# Patient Record
Sex: FEMALE | Race: WHITE | NOT HISPANIC OR LATINO | Employment: OTHER | URBAN - METROPOLITAN AREA
[De-identification: names, ages, dates, MRNs, and addresses within clinical notes are randomized per-mention and may not be internally consistent; named-entity substitution may affect disease eponyms.]

---

## 2023-10-09 ENCOUNTER — TELEPHONE (OUTPATIENT)
Dept: CARDIOLOGY | Facility: CLINIC | Age: 73
End: 2023-10-09
Payer: COMMERCIAL

## 2023-10-09 NOTE — TELEPHONE ENCOUNTER
I have not data on the new vaccines and their correlation of cardiac side effects   Previous covid vaccine caused myocarditis in young men. No other populations were noted.

## 2023-11-29 ENCOUNTER — OFFICE VISIT (OUTPATIENT)
Dept: CARDIOLOGY | Facility: CLINIC | Age: 73
End: 2023-11-29
Payer: COMMERCIAL

## 2023-11-29 ENCOUNTER — HOSPITAL ENCOUNTER (OUTPATIENT)
Dept: CARDIOLOGY | Facility: CLINIC | Age: 73
Discharge: HOME | End: 2023-11-29
Payer: COMMERCIAL

## 2023-11-29 VITALS
SYSTOLIC BLOOD PRESSURE: 138 MMHG | HEIGHT: 67 IN | DIASTOLIC BLOOD PRESSURE: 68 MMHG | WEIGHT: 181.1 LBS | HEART RATE: 75 BPM | BODY MASS INDEX: 28.43 KG/M2

## 2023-11-29 DIAGNOSIS — I10 HYPERTENSION, UNSPECIFIED TYPE: ICD-10-CM

## 2023-11-29 DIAGNOSIS — I42.1 HOCM (HYPERTROPHIC OBSTRUCTIVE CARDIOMYOPATHY) (MULTI): ICD-10-CM

## 2023-11-29 DIAGNOSIS — I42.1 HOCM (HYPERTROPHIC OBSTRUCTIVE CARDIOMYOPATHY) (MULTI): Primary | ICD-10-CM

## 2023-11-29 DIAGNOSIS — R00.2 PALPITATIONS: ICD-10-CM

## 2023-11-29 LAB
AORTIC VALVE MEAN GRADIENT: 11
AORTIC VALVE PEAK VELOCITY: 2.25
AV PEAK GRADIENT: 20.2
AVA (PEAK VEL): 2
AVA (VTI): 1.99
EJECTION FRACTION APICAL 4 CHAMBER: 72.3
EJECTION FRACTION: 71
GLOBAL LONGITUDINAL STRAIN: -24
LEFT ATRIUM VOLUME AREA LENGTH INDEX BSA: 32.9
LEFT VENTRICLE INTERNAL DIMENSION DIASTOLE: 3.3 (ref 3.5–6)
LEFT VENTRICULAR OUTFLOW TRACT DIAMETER: 1.85
MITRAL VALVE E/A RATIO: 0.72
MITRAL VALVE E/E' RATIO: 19.4
RIGHT VENTRICLE FREE WALL PEAK S': 14
RIGHT VENTRICLE PEAK SYSTOLIC PRESSURE: 29.9
TRICUSPID ANNULAR PLANE SYSTOLIC EXCURSION: 1.8

## 2023-11-29 PROCEDURE — 93306 TTE W/DOPPLER COMPLETE: CPT | Performed by: INTERNAL MEDICINE

## 2023-11-29 PROCEDURE — 1036F TOBACCO NON-USER: CPT | Performed by: NURSE PRACTITIONER

## 2023-11-29 PROCEDURE — 3075F SYST BP GE 130 - 139MM HG: CPT | Performed by: NURSE PRACTITIONER

## 2023-11-29 PROCEDURE — 3078F DIAST BP <80 MM HG: CPT | Performed by: NURSE PRACTITIONER

## 2023-11-29 PROCEDURE — 99214 OFFICE O/P EST MOD 30 MIN: CPT | Mod: 25 | Performed by: NURSE PRACTITIONER

## 2023-11-29 PROCEDURE — 93306 TTE W/DOPPLER COMPLETE: CPT

## 2023-11-29 PROCEDURE — 1160F RVW MEDS BY RX/DR IN RCRD: CPT | Performed by: NURSE PRACTITIONER

## 2023-11-29 PROCEDURE — 99214 OFFICE O/P EST MOD 30 MIN: CPT | Performed by: NURSE PRACTITIONER

## 2023-11-29 PROCEDURE — 1159F MED LIST DOCD IN RCRD: CPT | Performed by: NURSE PRACTITIONER

## 2023-11-29 RX ORDER — CHOLECALCIFEROL (VITAMIN D3) 50 MCG
1 TABLET ORAL DAILY
COMMUNITY

## 2023-11-29 RX ORDER — ATORVASTATIN CALCIUM 20 MG/1
20 TABLET, FILM COATED ORAL
COMMUNITY

## 2023-11-29 RX ORDER — DILTIAZEM HYDROCHLORIDE 120 MG/1
1 CAPSULE, EXTENDED RELEASE ORAL EVERY 24 HOURS
COMMUNITY
End: 2023-12-11 | Stop reason: SDUPTHER

## 2023-11-29 RX ORDER — CALCIUM CARBONATE 300MG(750)
TABLET,CHEWABLE ORAL
COMMUNITY

## 2023-11-29 RX ORDER — CHOLECALCIFEROL (VITAMIN D3) 125 MCG
CAPSULE ORAL
COMMUNITY

## 2023-11-29 RX ORDER — FLUTICASONE PROPIONATE 50 MCG
SPRAY, SUSPENSION (ML) NASAL
COMMUNITY

## 2023-11-29 RX ORDER — ASCORBIC ACID 300 MG
TABLET,CHEWABLE ORAL
COMMUNITY

## 2023-11-29 NOTE — PROGRESS NOTES
Chief Complaint:   HOCM     History Of Present Illness:    Trisha Thomas is a 73 y.o. female here with HOCM    No cardiac complaints. She denies SOB, syncope, dizziness, lightheadedness.     Echocardiogram 1/9/2023  Severe upper septal hypertrophy      Allergies:  Patient has no allergy information on record.    Review of Systems  All pertinent systems have been reviewed and are negative except for what is stated in the history of present illness.    All other systems have been reviewed and are negative and noncontributory to this patient's current ailments.     Objective   Vitals reviewed.   Constitutional:       Appearance: Healthy appearance. Not in distress.   Neck:      Vascular: No JVR. JVD normal.   Pulmonary:      Effort: Pulmonary effort is normal.      Breath sounds: Normal breath sounds. No wheezing. No rhonchi. No rales.   Chest:      Chest wall: Not tender to palpatation.   Cardiovascular:      PMI at left midclavicular line. Normal rate. Regular rhythm. Normal S1. Normal S2.       Murmurs: There is a grade 2/6 systolic murmur.      No gallop.  No click. No rub.   Edema:     Peripheral edema absent.   Abdominal:      General: Bowel sounds are normal.      Palpations: Abdomen is soft.      Tenderness: There is no abdominal tenderness.   Musculoskeletal: Normal range of motion.         General: No tenderness. Skin:     General: Skin is warm and dry.   Neurological:      General: No focal deficit present.      Mental Status: Alert and oriented to person, place and time.       Assessment/Plan   Diagnoses and all orders for this visit:  HOCM (hypertrophic obstructive cardiomyopathy) (CMS/HCC)  - s/p echo today  - annual echo  - remains asymptomatic   Palpitations  - resolved  Hypertension, unspecified type  - stable, well controlled      Current Outpatient Medications:     atorvastatin (Lipitor) 20 mg tablet, Take 1 tablet (20 mg) by mouth once daily., Disp: , Rfl:     biotin 5,000 mcg tablet,disintegrating,  Take by mouth., Disp: , Rfl:     cholecalciferol (Vitamin D-3) 50 MCG (2000 UT) tablet, Take 1 tablet (2,000 Units) by mouth once daily., Disp: , Rfl:     dilTIAZem SR (Cardizem SR) 120 mg 12 hr capsule, 1 capsule (120 mg) once every 24 hours., Disp: , Rfl:     fluticasone (Flonase Allergy Relief) 50 mcg/actuation nasal spray, 2 spray(s) each nostril intranasally once a day prn, Disp: , Rfl:     glucos sul 2KCl-msm-chond-C-Mn (Glucosamine Chondroitin) 550-30-1 mg capsule, Take by mouth., Disp: , Rfl:     magnesium oxide (Mag-Ox) 400 mg tablet, Take by mouth., Disp: , Rfl:

## 2023-12-04 ENCOUNTER — TELEPHONE (OUTPATIENT)
Dept: CARDIOLOGY | Facility: CLINIC | Age: 73
End: 2023-12-04
Payer: COMMERCIAL

## 2023-12-04 NOTE — TELEPHONE ENCOUNTER
Pt would like to know what this means prognostically. Pt would like to know if septal reduction procedure needs done and when to return for a follow-up? She would like to directly speak with you as she has questions and would like to have a virtual visit this week. Please advise. Thank you

## 2023-12-04 NOTE — TELEPHONE ENCOUNTER
Pt called and would like results of echo and also when should she schedule a follow up ? Please advise. Thank you

## 2023-12-11 ENCOUNTER — TELEMEDICINE (OUTPATIENT)
Dept: CARDIOLOGY | Facility: CLINIC | Age: 73
End: 2023-12-11
Payer: COMMERCIAL

## 2023-12-11 DIAGNOSIS — I42.1 HOCM (HYPERTROPHIC OBSTRUCTIVE CARDIOMYOPATHY) (MULTI): Primary | ICD-10-CM

## 2023-12-11 PROCEDURE — 99213 OFFICE O/P EST LOW 20 MIN: CPT | Performed by: NURSE PRACTITIONER

## 2023-12-11 RX ORDER — DILTIAZEM HYDROCHLORIDE 120 MG/1
120 CAPSULE, EXTENDED RELEASE ORAL EVERY 24 HOURS
Qty: 90 CAPSULE | Refills: 3 | Status: SHIPPED | OUTPATIENT
Start: 2023-12-11 | End: 2023-12-13 | Stop reason: ENTERED-IN-ERROR

## 2023-12-11 NOTE — PROGRESS NOTES
Chief Complaint:   HOCM     History Of Present Illness:    Trisha Thomas is a 73 y.o. female here with HOCM.    Phone call to discuss echo results.     No cardiac complaints. She denies SOB, syncope, dizziness, lightheadedness.     Echocardiogram 1/9/2023  Severe upper septal hypertrophy      Allergies:  Escitalopram oxalate    Review of Systems  All pertinent systems have been reviewed and are negative except for what is stated in the history of present illness.    All other systems have been reviewed and are negative and noncontributory to this patient's current ailments.       Assessment/Plan   Diagnoses and all orders for this visit:  HOCM (hypertrophic obstructive cardiomyopathy) (CMS/[facility])  - we reviewed echo results which showed moderate LV obstruction  - she remains asymptomatic. We discussed if she would become symptomatic we could pursue surgery vs ETOH ablation  - symptoms to monitor for were HARRIS, syncope, palpitations, chest pain  - already on appropriate medication with Cardizem  - if additional agent needed we will start metoprolol succinate. Avoiding carvedilol and labetalol due to vasodilatory affects   - avoid amlodipine  - avoid dehydration and agents that cause hypovolemia   - annual echo or sooner if symptoms  - remains asymptomatic       Follow up in 6 months     Outpatient Medications:  Current Outpatient Medications   Medication Instructions    atorvastatin (LIPITOR) 20 mg, oral, Daily RT    biotin 5,000 mcg tablet,disintegrating oral    cholecalciferol (Vitamin D-3) 50 MCG (2000 UT) tablet 1 tablet, oral, Daily    dilTIAZem SR (Cardizem SR) 120 mg 12 hr capsule 1 capsule, Every 24 hours    fluticasone (Flonase Allergy Relief) 50 mcg/actuation nasal spray 2 spray(s) each nostril intranasally once a day prn    glucos sul 2KCl-msm-chond-C-Mn (Glucosamine Chondroitin) 550-30-1 mg capsule oral    magnesium oxide (Mag-Ox) 400 mg tablet oral

## 2023-12-12 ENCOUNTER — TELEPHONE (OUTPATIENT)
Dept: CARDIOLOGY | Facility: CLINIC | Age: 73
End: 2023-12-12
Payer: COMMERCIAL

## 2023-12-12 DIAGNOSIS — I42.1 HOCM (HYPERTROPHIC OBSTRUCTIVE CARDIOMYOPATHY) (MULTI): ICD-10-CM

## 2023-12-12 NOTE — TELEPHONE ENCOUNTER
Received a note from the pharmacy that the  patient has been taking Cardizem CD.  They received the refill today for Cardizem SR and wants to know if this is correct. Is the patient changing to Cardizem SR?  Please advise

## 2023-12-13 RX ORDER — DILTIAZEM HYDROCHLORIDE 120 MG/1
120 CAPSULE, COATED, EXTENDED RELEASE ORAL DAILY
COMMUNITY
End: 2023-12-13 | Stop reason: SDUPTHER

## 2023-12-13 RX ORDER — DILTIAZEM HYDROCHLORIDE 120 MG/1
120 CAPSULE, COATED, EXTENDED RELEASE ORAL DAILY
Qty: 90 CAPSULE | Refills: 3 | Status: SHIPPED | OUTPATIENT
Start: 2023-12-13 | End: 2024-02-20 | Stop reason: SDUPTHER

## 2024-02-20 DIAGNOSIS — I42.1 HOCM (HYPERTROPHIC OBSTRUCTIVE CARDIOMYOPATHY) (MULTI): ICD-10-CM

## 2024-02-20 RX ORDER — DILTIAZEM HYDROCHLORIDE 120 MG/1
120 CAPSULE, COATED, EXTENDED RELEASE ORAL DAILY
Qty: 90 CAPSULE | Refills: 3 | Status: SHIPPED | OUTPATIENT
Start: 2024-02-20

## 2024-06-11 ENCOUNTER — OFFICE VISIT (OUTPATIENT)
Dept: CARDIOLOGY | Facility: CLINIC | Age: 74
End: 2024-06-11
Payer: COMMERCIAL

## 2024-06-11 VITALS
HEART RATE: 72 BPM | SYSTOLIC BLOOD PRESSURE: 147 MMHG | BODY MASS INDEX: 28.35 KG/M2 | DIASTOLIC BLOOD PRESSURE: 72 MMHG | WEIGHT: 181 LBS

## 2024-06-11 DIAGNOSIS — R00.2 PALPITATIONS: ICD-10-CM

## 2024-06-11 DIAGNOSIS — I10 HYPERTENSION, UNSPECIFIED TYPE: ICD-10-CM

## 2024-06-11 DIAGNOSIS — I42.1 HOCM (HYPERTROPHIC OBSTRUCTIVE CARDIOMYOPATHY) (MULTI): Primary | ICD-10-CM

## 2024-06-11 PROCEDURE — 1036F TOBACCO NON-USER: CPT | Performed by: NURSE PRACTITIONER

## 2024-06-11 PROCEDURE — 1159F MED LIST DOCD IN RCRD: CPT | Performed by: NURSE PRACTITIONER

## 2024-06-11 PROCEDURE — 1160F RVW MEDS BY RX/DR IN RCRD: CPT | Performed by: NURSE PRACTITIONER

## 2024-06-11 PROCEDURE — 99213 OFFICE O/P EST LOW 20 MIN: CPT | Performed by: NURSE PRACTITIONER

## 2024-06-11 PROCEDURE — 3077F SYST BP >= 140 MM HG: CPT | Performed by: NURSE PRACTITIONER

## 2024-06-11 PROCEDURE — 3078F DIAST BP <80 MM HG: CPT | Performed by: NURSE PRACTITIONER

## 2024-06-11 NOTE — PROGRESS NOTES
Chief Complaint:   HOCM     History Of Present Illness:    Trisha Thomas is a 74 y.o. female here with HOCM    No cardiac complaints. She denies chest pain, SOB, syncope, dizziness, lightheadedness. Was able to lift and move mulch without complaint.     TTE 11/29/2023   1. Left ventricular systolic function is hyperdynamic.   2. Severely increased left ventricular septal thickness.   3. Spectral Doppler shows an impaired relaxation pattern of left ventricular diastolic filling.   4. There is hypertrophic cardiomyopathy.   5. The mitral valve is moderately thickened.   6. Mild aortic valve stenosis.   7. There is a moderate left ventricular outflow tract obstruction.    Past Medical History:  She has a past medical history of Hypertension.    Past Surgical History:  She has no past surgical history on file.      Social History:  She reports that she has never smoked. She has never used smokeless tobacco. No history on file for alcohol use and drug use.    Family History:  Family History   Problem Relation Name Age of Onset    Hyperlipidemia Mother      Heart attack Father      Hyperlipidemia Mother's Brother       Allergies:  Escitalopram oxalate    Review of Systems  All pertinent systems have been reviewed and are negative except for what is stated in the history of present illness.    All other systems have been reviewed and are negative and noncontributory to this patient's current ailments.     Visit Vitals  /72 (BP Location: Right arm, Patient Position: Sitting, BP Cuff Size: Large adult)   Pulse 72   Wt 82.1 kg (181 lb)   BMI 28.35 kg/m²   Smoking Status Never   BSA 1.97 m²     Objective   Vitals reviewed.   Constitutional:       Appearance: Healthy appearance. Not in distress.   Neck:      Vascular: No JVR. JVD normal.   Pulmonary:      Effort: Pulmonary effort is normal.      Breath sounds: Normal breath sounds. No wheezing. No rhonchi. No rales.   Chest:      Chest wall: Not tender to palpatation.    Cardiovascular:      PMI at left midclavicular line. Normal rate. Regular rhythm. Normal S1. Normal S2.       Murmurs: There is a grade 2/6 systolic murmur.      No gallop.  No click. No rub.   Edema:     Peripheral edema absent.   Abdominal:      General: Bowel sounds are normal.      Palpations: Abdomen is soft.      Tenderness: There is no abdominal tenderness.   Musculoskeletal: Normal range of motion.         General: No tenderness. Skin:     General: Skin is warm and dry.   Neurological:      General: No focal deficit present.      Mental Status: Alert and oriented to person, place and time.       Assessment/Plan   Diagnoses and all orders for this visit:  HOCM (hypertrophic obstructive cardiomyopathy) (CMS/HCC)  - asymptomatic   - echo in 6 months  - remains asymptomatic   - avoid dehydration and agents that cause hypovolemia  Palpitations  - resolved  Hypertension, unspecified type  - stable  - if additional agent needed we will start metoprolol succinate. Avoiding carvedilol and labetalol due to vasodilatory affects   - avoid amlodipine    Outpatient Medications:  Current Outpatient Medications   Medication Instructions    atorvastatin (LIPITOR) 20 mg, oral, Daily RT    biotin 5,000 mcg tablet,disintegrating oral    cholecalciferol (Vitamin D-3) 50 MCG (2000 UT) tablet 1 tablet, oral, Daily    dilTIAZem CD (CARDIZEM CD) 120 mg, oral, Daily    fluticasone (Flonase Allergy Relief) 50 mcg/actuation nasal spray 2 spray(s) each nostril intranasally once a day prn    glucos sul 2KCl-msm-chond-C-Mn (Glucosamine Chondroitin) 550-30-1 mg capsule oral    magnesium oxide (Mag-Ox) 400 mg tablet oral     Exclusive of any other services or procedures performed, I, Ana M MARTINEZ, spent 20 minutes in duration for this visit today.  This time consisted of chart review, obtaining history, and/or performing the exam as documented above, as well as, documenting the clinical information for the encounter in the  electronic record, discussing treatment options, plans, and/or goals with patient, family, and/or caregiver, refilling medications, updating the electronic record, ordering medicines, lab work, imaging, referrals, and/or procedures as documented above and communicating with other [facility] professionals. I have discussed the results of laboratory, radiology, and cardiology studies with the patient and their family/caregiver.

## 2024-12-17 ENCOUNTER — APPOINTMENT (OUTPATIENT)
Dept: CARDIOLOGY | Facility: CLINIC | Age: 74
End: 2024-12-17
Payer: COMMERCIAL

## 2024-12-17 ENCOUNTER — HOSPITAL ENCOUNTER (OUTPATIENT)
Dept: CARDIOLOGY | Facility: CLINIC | Age: 74
Discharge: HOME | End: 2024-12-17
Payer: COMMERCIAL

## 2024-12-17 VITALS
HEART RATE: 83 BPM | TEMPERATURE: 98.2 F | BODY MASS INDEX: 27.97 KG/M2 | HEIGHT: 66 IN | DIASTOLIC BLOOD PRESSURE: 70 MMHG | SYSTOLIC BLOOD PRESSURE: 132 MMHG | WEIGHT: 174 LBS

## 2024-12-17 DIAGNOSIS — I10 HYPERTENSION, UNSPECIFIED TYPE: ICD-10-CM

## 2024-12-17 DIAGNOSIS — R00.2 PALPITATIONS: ICD-10-CM

## 2024-12-17 DIAGNOSIS — I42.1 HOCM (HYPERTROPHIC OBSTRUCTIVE CARDIOMYOPATHY) (MULTI): ICD-10-CM

## 2024-12-17 DIAGNOSIS — I42.1 HOCM (HYPERTROPHIC OBSTRUCTIVE CARDIOMYOPATHY) (MULTI): Primary | ICD-10-CM

## 2024-12-17 LAB
AORTIC VALVE MEAN GRADIENT: 12 MMHG
AORTIC VALVE PEAK VELOCITY: 2.46 M/S
AV PEAK GRADIENT: 24 MMHG
AVA (PEAK VEL): 1.84 CM2
AVA (VTI): 1.93 CM2
EJECTION FRACTION APICAL 4 CHAMBER: 73.4
EJECTION FRACTION: 75 %
GLOBAL LONGITUDINAL STRAIN: -21 %
LEFT ATRIUM VOLUME AREA LENGTH INDEX BSA: 29.4 ML/M2
LEFT VENTRICLE INTERNAL DIMENSION DIASTOLE: 2.89 CM (ref 3.5–6)
LEFT VENTRICULAR OUTFLOW TRACT DIAMETER: 1.81 CM
MITRAL VALVE E/A RATIO: 0.75
RIGHT VENTRICLE FREE WALL PEAK S': 14 CM/S
RIGHT VENTRICLE PEAK SYSTOLIC PRESSURE: 30.6 MMHG
TRICUSPID ANNULAR PLANE SYSTOLIC EXCURSION: 1.6 CM

## 2024-12-17 PROCEDURE — 93306 TTE W/DOPPLER COMPLETE: CPT | Performed by: INTERNAL MEDICINE

## 2024-12-17 PROCEDURE — 1159F MED LIST DOCD IN RCRD: CPT | Performed by: NURSE PRACTITIONER

## 2024-12-17 PROCEDURE — 93356 MYOCRD STRAIN IMG SPCKL TRCK: CPT | Performed by: INTERNAL MEDICINE

## 2024-12-17 PROCEDURE — 99214 OFFICE O/P EST MOD 30 MIN: CPT | Performed by: NURSE PRACTITIONER

## 2024-12-17 PROCEDURE — 93356 MYOCRD STRAIN IMG SPCKL TRCK: CPT

## 2024-12-17 PROCEDURE — 1036F TOBACCO NON-USER: CPT | Performed by: NURSE PRACTITIONER

## 2024-12-17 PROCEDURE — 3075F SYST BP GE 130 - 139MM HG: CPT | Performed by: NURSE PRACTITIONER

## 2024-12-17 PROCEDURE — 99214 OFFICE O/P EST MOD 30 MIN: CPT | Mod: 25 | Performed by: NURSE PRACTITIONER

## 2024-12-17 PROCEDURE — 1160F RVW MEDS BY RX/DR IN RCRD: CPT | Performed by: NURSE PRACTITIONER

## 2024-12-17 PROCEDURE — 3078F DIAST BP <80 MM HG: CPT | Performed by: NURSE PRACTITIONER

## 2024-12-17 NOTE — PROGRESS NOTES
"Chief Complaint:   HOCM     History Of Present Illness:    Trisha Thomas is a 74 y.o. female here with HOCM. She has no cardiac complaints. Denies SOB, LE edema, syncope, dizziness and lightheadedness.    TTE 11/29/2023   1. Left ventricular systolic function is hyperdynamic.   2. Severely increased left ventricular septal thickness.   3. Spectral Doppler shows an impaired relaxation pattern of left ventricular diastolic filling.   4. There is hypertrophic cardiomyopathy.   5. The mitral valve is moderately thickened.   6. Mild aortic valve stenosis.   7. There is a moderate left ventricular outflow tract obstruction.    + stress,  living at home with alzheimer     Past Medical History:  She has a past medical history of Hypertension.    Past Surgical History:  She has no past surgical history on file.      Social History:  She reports that she has never smoked. She has never used smokeless tobacco. No history on file for alcohol use and drug use.    Family History:  Family History   Problem Relation Name Age of Onset    Hyperlipidemia Mother      Heart attack Father      Hyperlipidemia Mother's Brother       Allergies:  Escitalopram oxalate    Review of Systems  All pertinent systems have been reviewed and are negative except for what is stated in the history of present illness.    All other systems have been reviewed and are negative and noncontributory to this patient's current ailments.     Visit Vitals  /70 (BP Location: Right arm)   Pulse 83   Temp 36.8 °C (98.2 °F)   Ht 1.676 m (5' 6\")   Wt 78.9 kg (174 lb)   BMI 28.08 kg/m²   Smoking Status Never   BSA 1.92 m²     Objective   Vitals reviewed.   Constitutional:       Appearance: Healthy appearance. Not in distress.   Neck:      Vascular: No JVR. JVD normal.   Pulmonary:      Effort: Pulmonary effort is normal.      Breath sounds: Normal breath sounds. No wheezing. No rhonchi. No rales.   Chest:      Chest wall: Not tender to palpatation. "   Cardiovascular:      PMI at left midclavicular line. Normal rate. Regular rhythm. Normal S1. Normal S2.       Murmurs: There is a grade 2/6 systolic murmur.      No gallop.  No click. No rub.   Edema:     Peripheral edema absent.   Abdominal:      General: Bowel sounds are normal.      Palpations: Abdomen is soft.      Tenderness: There is no abdominal tenderness.   Musculoskeletal: Normal range of motion.         General: No tenderness. Skin:     General: Skin is warm and dry.   Neurological:      General: No focal deficit present.      Mental Status: Alert and oriented to person, place and time.       Assessment/Plan   Diagnoses and all orders for this visit:  HOCM (hypertrophic obstructive cardiomyopathy) (CMS/HCC)  - asymptomatic   - annual echo   - remains asymptomatic   - avoid dehydration and agents that cause hypovolemia  Palpitations  - resolved  Hypertension, unspecified type  - stable  - well controlled, continue diltiazem   - if additional agent is ever needed we will start metoprolol succinate. Avoiding carvedilol and labetalol due to vasodilatory affects   - avoid amlodipine    Follow up 1 year   Outpatient Medications:  Current Outpatient Medications   Medication Instructions    atorvastatin (LIPITOR) 20 mg, oral, Daily RT    biotin 5,000 mcg tablet,disintegrating oral    cholecalciferol (Vitamin D-3) 50 MCG (2000 UT) tablet 1 tablet, oral, Daily    dilTIAZem CD (CARDIZEM CD) 120 mg, oral, Daily    fluticasone (Flonase Allergy Relief) 50 mcg/actuation nasal spray 2 spray(s) each nostril intranasally once a day prn    glucos sul 2KCl-msm-chond-C-Mn (Glucosamine Chondroitin) 550-30-1 mg capsule oral    magnesium oxide (Mag-Ox) 400 mg tablet oral     Exclusive of any other services or procedures performed, Ana M HATHAWAY-NIHARIKA, spent 25 minutes in duration for this visit today.  This time consisted of chart review, obtaining history, and/or performing the exam as documented above, as well as,  documenting the clinical information for the encounter in the electronic record, discussing treatment options, plans, and/or goals with patient, family, and/or caregiver, refilling medications, updating the electronic record, ordering medicines, lab work, imaging, referrals, and/or procedures as documented above and communicating with other [facility] professionals. I have discussed the results of laboratory, radiology, and cardiology studies with the patient and their family/caregiver.

## 2025-02-26 DIAGNOSIS — I42.1 HOCM (HYPERTROPHIC OBSTRUCTIVE CARDIOMYOPATHY) (MULTI): ICD-10-CM

## 2025-02-26 RX ORDER — DILTIAZEM HYDROCHLORIDE 120 MG/1
120 CAPSULE, COATED, EXTENDED RELEASE ORAL DAILY
Qty: 90 CAPSULE | Refills: 3 | Status: SHIPPED | OUTPATIENT
Start: 2025-02-26

## 2025-12-17 ENCOUNTER — APPOINTMENT (OUTPATIENT)
Dept: CARDIOLOGY | Facility: CLINIC | Age: 75
End: 2025-12-17
Payer: COMMERCIAL